# Patient Record
Sex: FEMALE | Race: WHITE | Employment: FULL TIME | URBAN - METROPOLITAN AREA
[De-identification: names, ages, dates, MRNs, and addresses within clinical notes are randomized per-mention and may not be internally consistent; named-entity substitution may affect disease eponyms.]

---

## 2018-07-03 ENCOUNTER — THERAPY VISIT (OUTPATIENT)
Dept: PHYSICAL THERAPY | Facility: CLINIC | Age: 48
End: 2018-07-03
Payer: COMMERCIAL

## 2018-07-03 DIAGNOSIS — M25.512 LEFT SHOULDER PAIN, UNSPECIFIED CHRONICITY: ICD-10-CM

## 2018-07-03 PROCEDURE — 97161 PT EVAL LOW COMPLEX 20 MIN: CPT | Mod: GP | Performed by: PHYSICAL THERAPIST

## 2018-07-03 PROCEDURE — 97110 THERAPEUTIC EXERCISES: CPT | Mod: GP | Performed by: PHYSICAL THERAPIST

## 2018-07-03 NOTE — MR AVS SNAPSHOT
After Visit Summary   7/3/2018    Tami Pedro    MRN: [id number]           Patient Information     Date Of Birth          1970        Visit Information        Provider Department      7/3/2018 2:10 PM Raven Florian PT [facility]        Today's Diagnoses     Left shoulder pain, unspecified chronicity           Follow-ups after your visit        Your next 10 appointments already scheduled     Jul 11, 2018  9:30 AM CDT   [facility] Extremity with Raven Florian PT   [facility] ([facility])    [address] [place] [id number] [phone]              Who to contact     If you have questions or need follow up information about today's clinic visit or your schedule please contact [facility] THERAP[facility] directly at [phone].  Normal or non-critical lab and imaging results will be communicated to you by MyChart, letter or phone within 4 business days after the clinic has received the results. If you do not hear from us within 7 days, please contact the clinic through [employer] or phone. If you have a critical or abnormal lab result, we will notify you by phone as soon as possible.  Submit refill requests through [employer] or call your pharmacy and they will forward the refill request to us. Please allow 3 business days for your refill to be completed.          Additional Information About Your Visit        MyChart Information     [employer] gives you secure access to your electronic health record. If you see a primary care provider, you can also send messages to your care team and make appointments. If you have questions, please call your primary care clinic.  If you do not have a primary care provider, please call [phone] and they will assist you.        Care EveryWhere ID     This is your Care EveryWhere ID. This could be used by other organizations to access your [place] medical records  [id number]          Blood Pressure from Last 3 Encounters:   No data found for BP    Weight from Last 3 Encounters:   No data found for Wt              We Performed the Following     HC PT EVAL, LOW COMPLEXITY     [facility] INITIAL EVAL REPORT     THERAPEUTIC EXERCISES        Primary Care Provider Office Phone # Fax #    Gladys Kraft [phone]       [address]   [facility] [place] [address]        Equal Access to Services     [facility]: Hadii aad ku hadasho Soomaali, waaxda luqadaha, qaybta kaalmada adeegyada, waxay idiin hayaan adeeg kharash la'aan . So [facility] [phone].    ATENCIÓN: Si habla español, tiene a goodwin disposición servicios gratuitos de asistencia lingüística. Llame al [phone].    We comply with applicable federal civil rights laws and [place] laws. We do not discriminate on the basis of race, color, national origin, age, disability, sex, sexual orientation, or gender identity.            Thank you!     Thank you for choosing [facility] PHYSICAL THERAPY [facility]  for your care. Our goal is always to provide you with excellent care. Hearing back from our patients is one way we can continue to improve our services. Please take a few minutes to complete the written survey that you may receive in the mail after your visit with us. Thank you!             Your Updated Medication List - Protect others around you: Learn how to safely use, store and throw away your medicines at www.disposemymeds.org.      Notice  As of 7/3/2018  2:47 PM    You have not been prescribed any medications.

## 2018-07-03 NOTE — PROGRESS NOTES
KEY PT FINDINGS:  1) Rotator cuff weakness, pain with resisted abduction only  2) Lacking internal rotation motion (functional IR up the back)  3) Scapular winging - assess scapular strength at the next visit    Physical Therapy Initial Evaluation: Subjective History     Injury/Condition Details:  Presenting Complaint Left Shoulder (Right hand dominant)   Onset Timing/Date Mid June 2018   Mechanism Woke up one day and had intermittent pain with certain motions. She thought she slept funny. She could feel things moving in her shoulder.   She notes that it feels better recently but it isn't gone.      Symptom Behavior Details    Primary Symptoms Sporadic symptoms; Activity/position dependent, pain (Location: Lateral arm occasional pain to the elbow but not often, Quality: Sharp and Aching/Throbbing), stiffness, weakness   Worst Pain 7/10 (with reaching behind her back )   Symptom Provocators Reaching up the back (hooking the bra), reaching out to the side, lifting heavier items.   Denies pain at night with sleeping. Can lay on her left shoulder.    Best Pain 0/10    Symptom Relievers Ice, ibuprofen   Time of day dependent? No   Recent symptom change? 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    Usual physical activities  (within past year) Garden (has not been able to do that for the past two weeks).    Orthopaedic history None   Notable medical history See [facility] Initial Evaluation  Subjective:  HPI                    Objective:  Standing Alignment:    Cervical/Thoracic:  Forward head  Shoulder/UE:  Rounded shoulders, scapular winging L and scapular winging R                  Flexibility/Screens:   Positive screens:  ShoulderNegative screens: Cervical    Upper Extremity:    Decreased left upper extremity flexibility at:  Pectoralis Major and Pectoralis Minor    Decreased right upper extremity flexibility present at:  Pectoralis Major and Pectoralis Minor                           Shoulder Evaluation:  ROM:  AROM:  normal          Internal Rotation:  Left:  To T12    Right:  To T9                    Pain: End range internal rotation and mild pain at end range ER    Strength:        Abduction:  Left:/5  Pain:+    Right: 4+/5     Pain:  Adduction:  Left: 4+/5    Pain:    Right: 4+/5     Pain:  Internal Rotation:  Left:4+/5     Pain:    Right: 4+/5     Pain:  External Rotation:   Left:4/5     Pain:   Right:4/5     Pain:            Stability Testing:  not assessed      Special Tests:    Left shoulder positive for the following special tests:  Impingement  Left shoulder negative for the following special tests:  Rotator cuff tear    Palpation:    Left shoulder tenderness present at:  Supraspinatus; Upper Trap and Bicipital Groove                                       General     ROS    Assessment/Plan:    Patient is a 48 year old female with left side shoulder complaints.    Patient has the following significant findings with corresponding treatment plan.                Diagnosis 1:  Rotator Cuff Syndrome  Pain -  hot/cold therapy, manual therapy, STS, splint/taping/bracing/orthotics, self management and education  Decreased ROM/flexibility - manual therapy, therapeutic exercise and home program  Decreased joint mobility - manual therapy, therapeutic exercise and home program  Decreased strength - therapeutic exercise, therapeutic activities and home program  Impaired muscle performance - neuro re-education and home program  Decreased function - therapeutic activities and home program    Therapy Evaluation Codes:   1) History comprised of:   Personal factors that impact the plan of care:      Time since onset of symptoms.    Comorbidity factors that impact the plan of  care are:      None.     Medications impacting care: None.  2) Examination of Body Systems comprised of:   Body structures and functions that impact the plan of care:      Shoulder.   Activity limitations that impact the plan of care are:      Dressing, Grasping, Lifting and Working.  3) Clinical presentation characteristics are:   Stable/Uncomplicated.  4) Decision-Making    Low complexity using standardized patient assessment instrument and/or measureable assessment of functional outcome.  Cumulative Therapy Evaluation is: Low complexity.    Previous and current functional limitations:  (See Goal Flow Sheet for this information)    Short term and Long term goals: (See Goal Flow Sheet for this information)     Communication ability:  Patient appears to be able to clearly communicate and understand verbal and written communication and follow directions correctly.  Treatment Explanation - The following has been discussed with the patient:   RX ordered/plan of care  Anticipated outcomes  Possible risks and side effects  This patient would benefit from PT intervention to resume normal activities.   Rehab potential is good.    Frequency:  1 X week, once daily  Duration:  for 4 weeks  Discharge Plan:  Achieve all LTG.  Independent in home treatment program.  Reach maximal therapeutic benefit.    Please refer to the daily flowsheet for treatment today, total treatment time and time spent performing 1:1 timed codes.

## 2018-07-04 ENCOUNTER — HEALTH MAINTENANCE LETTER (OUTPATIENT)
Age: 48
End: 2018-07-04

## 2018-08-02 ENCOUNTER — THERAPY VISIT (OUTPATIENT)
Dept: PHYSICAL THERAPY | Facility: CLINIC | Age: 48
End: 2018-08-02
Payer: COMMERCIAL

## 2018-08-02 DIAGNOSIS — M25.512 LEFT SHOULDER PAIN, UNSPECIFIED CHRONICITY: ICD-10-CM

## 2018-08-02 PROCEDURE — 97530 THERAPEUTIC ACTIVITIES: CPT | Mod: GP | Performed by: PHYSICAL THERAPIST

## 2018-08-02 NOTE — PROGRESS NOTES
Subjective:  HPI                    Objective:  System    Physical Exam    General     ROS    Assessment/Plan:    DISCHARGE REPORT    Progress reporting period is from 7/3 to 8/2.       SUBJECTIVE  Subjective changes noted by patient:  Reports that her shoulder is nearly 100%. She has been working diligently on her exercises. Has not yet added weights.     Current pain level is 0/10.     Previous pain level was  4/10.   Changes in function:  Yes (See Goal flowsheet attached for changes in current functional level)  Adverse reaction to treatment or activity: None    OBJECTIVE  Changes noted in objective findings:  The objective findings below are from DOS 8/2.  Objective: Full shoulder motion. No pain with resisted testing. Significantly improved SPADI.      ASSESSMENT/PLAN  Updated problem list and treatment plan: Diagnosis 1:  Rotator cuff dysfunction  None: is to continue to work on HEP  STG/LTGs have been met or progress has been made towards goals:  Yes (See Goal flow sheet completed today.)  Assessment of Progress: The patient has met all of their long term goals.  Self Management Plans:  Patient has been instructed in a home treatment program.  Patient  has been instructed in self management of symptoms.  I have re-evaluated this patient and find that the nature, scope, duration and intensity of the therapy is appropriate for the medical condition of the patient.  Tami continues to require the following intervention to meet STG and LTG's:  PT intervention is no longer required to meet STG/LTG.    Recommendations:  This patient is ready to be discharged from therapy and continue their home treatment program.    Please refer to the daily flowsheet for treatment today, total treatment time and time spent performing 1:1 timed codes.

## 2018-08-02 NOTE — MR AVS SNAPSHOT
After Visit Summary   8/2/2018    Tami Pedro    MRN: [id number]           Patient Information     Date Of Birth          1970        Visit Information        Provider Department      8/2/2018 10:20 AM Raven Florian, [facility]        Today's Diagnoses     Left shoulder pain, unspecified chronicity           Follow-ups after your visit        Who to contact     If you have questions or need follow up information about today's clinic visit or your schedule please contact [facility] PHYSICAL THERAPY [facility] directly at [phone].  Normal or non-critical lab and imaging results will be communicated to you by [employer], letter or phone within 4 business days after the clinic has received the results. If you do not hear from us within 7 days, please contact the clinic through [employer] or phone. If you have a critical or abnormal lab result, we will notify you by phone as soon as possible.  Submit refill requests through [employer] or call your pharmacy and they will forward the refill request to us. Please allow 3 business days for your refill to be completed.          Additional Information About Your Visit        MyChart Information     [employer] gives you secure access to your electronic health record. If you see a primary care provider, you can also send messages to your care team and make appointments. If you have questions, please call your primary care clinic.  If you do not have a primary care provider, please call [phone] and they will assist you.        Care EveryWhere ID     This is your Care EveryWhere ID. This could be used by other organizations to access your [place] medical records  [id number]         Blood Pressure from Last 3 Encounters:   No data found for BP    Weight from Last 3 Encounters:   No data found for Wt              We Performed the Following     THERAPEUTIC ACTIVITIES        Primary Care Provider Office Phone # Fax #    Gladys Kraft [phone]  [phone]       [address]   [place] [facility] [address]        Equal Access to Services     GABRIEL RIZZO : Hadii liz Black, agueda flores, bennygus connollyrileymaddy hensley, valeri louisemarinatonya wallace. So [facility] [phone].    ATENCIÓN: Si habla español, tiene a goodwin disposición servicios gratuitos de asistencia lingüística. Llame al [phone].    We comply with applicable federal civil rights laws and [place] laws. We do not discriminate on the basis of race, color, national origin, age, disability, sex, sexual orientation, or gender identity.            Thank you!     Thank you for choosing [facility] PHYSICAL THERAPY [facility]  for your care. Our goal is always to provide you with excellent care. Hearing back from our patients is one way we can continue to improve our services. Please take a few minutes to complete the written survey that you may receive in the mail after your visit with us. Thank you!             Your Updated Medication List - Protect others around you: Learn how to safely use, store and throw away your medicines at www.disposemymeds.org.      Notice  As of 8/2/2018 10:56 AM    You have not been prescribed any medications.

## 2019-12-13 ASSESSMENT — ANXIETY QUESTIONNAIRES
4. TROUBLE RELAXING: SEVERAL DAYS
3. WORRYING TOO MUCH ABOUT DIFFERENT THINGS: NOT AT ALL
6. BECOMING EASILY ANNOYED OR IRRITABLE: NOT AT ALL
2. NOT BEING ABLE TO STOP OR CONTROL WORRYING: NOT AT ALL
7. FEELING AFRAID AS IF SOMETHING AWFUL MIGHT HAPPEN: NOT AT ALL
1. FEELING NERVOUS, ANXIOUS, OR ON EDGE: SEVERAL DAYS
5. BEING SO RESTLESS THAT IT IS HARD TO SIT STILL: NOT AT ALL
GAD7 TOTAL SCORE: 2

## 2019-12-16 ASSESSMENT — ANXIETY QUESTIONNAIRES
GAD7 TOTAL SCORE: 2
7. FEELING AFRAID AS IF SOMETHING AWFUL MIGHT HAPPEN: NOT AT ALL
GAD7 TOTAL SCORE: 2

## 2019-12-16 ASSESSMENT — ENCOUNTER SYMPTOMS
MUSCLE WEAKNESS: 1
PANIC: 0
POLYPHAGIA: 0
JOINT SWELLING: 0
INSOMNIA: 1
MUSCLE CRAMPS: 0
CHILLS: 0
WEIGHT LOSS: 0
POLYDIPSIA: 0
HOT FLASHES: 1
ARTHRALGIAS: 0
HALLUCINATIONS: 0
DECREASED APPETITE: 0
DEPRESSION: 0
STIFFNESS: 0
BACK PAIN: 0
FEVER: 0
DECREASED CONCENTRATION: 0
NERVOUS/ANXIOUS: 1
ALTERED TEMPERATURE REGULATION: 0
INCREASED ENERGY: 1
MYALGIAS: 0
NIGHT SWEATS: 1
FATIGUE: 1
WEIGHT GAIN: 0
NECK PAIN: 0
DECREASED LIBIDO: 1

## 2019-12-17 ASSESSMENT — ANXIETY QUESTIONNAIRES: GAD7 TOTAL SCORE: 2

## 2020-01-13 ENCOUNTER — OFFICE VISIT (OUTPATIENT)
Dept: OBGYN | Facility: CLINIC | Age: 50
End: 2020-01-13
Attending: ADVANCED PRACTICE MIDWIFE
Payer: COMMERCIAL

## 2020-01-13 VITALS
WEIGHT: 108.4 LBS | HEIGHT: 59 IN | SYSTOLIC BLOOD PRESSURE: 120 MMHG | HEART RATE: 88 BPM | BODY MASS INDEX: 21.85 KG/M2 | DIASTOLIC BLOOD PRESSURE: 81 MMHG

## 2020-01-13 DIAGNOSIS — R68.82 LOW LIBIDO: ICD-10-CM

## 2020-01-13 DIAGNOSIS — R61 NIGHT SWEATS: ICD-10-CM

## 2020-01-13 DIAGNOSIS — N95.1 MENOPAUSAL SYNDROME (HOT FLASHES): ICD-10-CM

## 2020-01-13 DIAGNOSIS — N95.2 VAGINAL ATROPHY: ICD-10-CM

## 2020-01-13 DIAGNOSIS — Z78.0 MENOPAUSE: Primary | ICD-10-CM

## 2020-01-13 DIAGNOSIS — R53.83 FATIGUE, UNSPECIFIED TYPE: ICD-10-CM

## 2020-01-13 PROCEDURE — G0463 HOSPITAL OUTPT CLINIC VISIT: HCPCS | Mod: ZF

## 2020-01-13 RX ORDER — TRAZODONE HYDROCHLORIDE 50 MG/1
50 TABLET, FILM COATED ORAL
COMMUNITY

## 2020-01-13 SDOH — HEALTH STABILITY: MENTAL HEALTH: HOW OFTEN DO YOU HAVE A DRINK CONTAINING ALCOHOL?: 2-3 TIMES A WEEK

## 2020-01-13 ASSESSMENT — ANXIETY QUESTIONNAIRES
3. WORRYING TOO MUCH ABOUT DIFFERENT THINGS: NOT AT ALL
7. FEELING AFRAID AS IF SOMETHING AWFUL MIGHT HAPPEN: NOT AT ALL
1. FEELING NERVOUS, ANXIOUS, OR ON EDGE: SEVERAL DAYS
6. BECOMING EASILY ANNOYED OR IRRITABLE: SEVERAL DAYS
5. BEING SO RESTLESS THAT IT IS HARD TO SIT STILL: NOT AT ALL

## 2020-01-13 ASSESSMENT — PATIENT HEALTH QUESTIONNAIRE - PHQ9
5. POOR APPETITE OR OVEREATING: MORE THAN HALF THE DAYS
SUM OF ALL RESPONSES TO PHQ QUESTIONS 1-9: 6

## 2020-01-13 ASSESSMENT — MIFFLIN-ST. JEOR: SCORE: 1009.39

## 2020-01-13 NOTE — LETTER
2020       RE: Tami Pedro  [address] [facility] [address]     Dear Colleague,    Thank you for referring your patient, Tami Pedro, to the [facility] at [facility]. Please see a copy of my visit note below.    Multiple menopause s/s. Feeling very frustrated trying to find a provider who will give her options for treatment.   C/o of hot flashes multiple times a day  Night sweats almost nightly, soaks t-shirt or pjs.  Irritable  Low energy, fatigue  Decreased sex drive impacting her marriage  Vaginal dryness and pain with intercourse    Smokes 6-7 cigs/day, states last lipid panel normal, denies hx of migraine   No family or personal hx of DVT, CVD     SUBJECTIVE:  Tami Pedro is an 49 year old, ,  Menstrual History:  Menstrual History 2020   Menarche Age 12   Reviewed Today Yes           HISTORY:  traZODone (DESYREL) 50 MG tablet, Take 50 mg by mouth    No current facility-administered medications on file prior to visit.     Allergies   Allergen Reactions     Varenicline Rash       There is no immunization history on file for this patient.    OB History    Para Term  AB Living   1 1 0 1 0 0   SAB TAB Ectopic Multiple Live Births   0 0 0 0 0     History reviewed. No pertinent past medical history.  Past Surgical History:   Procedure Laterality Date     APPENDECTOMY  2002      SECTION       Family History   Problem Relation Age of Onset     Osteoarthritis Mother         in her spine     Hyperlipidemia Father      Cataracts Father      Osteoporosis Sister      Social History     Socioeconomic History     Marital status:      Spouse name: None     Number of children: None     Years of education: None     Highest education level: None   Occupational History     None   Social Needs     Financial resource strain: None     Food insecurity:     Worry: None     Inability: None     Transportation needs:  "    Medical: None     Non-medical: None   Tobacco Use     Smoking status: Current Every Day Smoker     Types: Cigarettes     Smokeless tobacco: Never Used   Substance and Sexual Activity     Alcohol use: Yes     Frequency: 2-3 times a week     Drug use: Never     Sexual activity: Yes     Partners: Male     Birth control/protection: Post-menopausal   Lifestyle     Physical activity:     Days per week: None     Minutes per session: None     Stress: None   Relationships     Social connections:     Talks on phone: None     Gets together: None     Attends Quaker service: None     Active member of club or organization: None     Attends meetings of clubs or organizations: None     Relationship status: None     Intimate partner violence:     Fear of current or ex partner: None     Emotionally abused: None     Physically abused: None     Forced sexual activity: None   Other Topics Concern     None   Social History Narrative     None    ROS: 10 point ROS neg other than the symptoms noted above in the HPI.      ROS    PHQ-9 SCORE 1/13/2020   PHQ-9 Total Score 6     MICHAEL-7 SCORE 12/13/2019 12/13/2019   Total Score 2 (minimal anxiety) 2 (minimal anxiety)   Total Score 2 2     ASCVD risk calculator: 1.6%, very low risk    EXAM:  Blood pressure 120/81, pulse 88, height 1.486 m (4' 10.5\"), weight 49.2 kg (108 lb 6.4 oz), not currently breastfeeding. Body mass index is 22.27 kg/m .  General - pleasant female in no acute distress.    Musculoskeletal - no gross deformities.  Neurological - normal strength, sensation, and mental status.        ASSESSMENT:  Encounter Diagnoses   Name Primary?     Menopause Yes     Vaginal atrophy      Menopausal syndrome (hot flashes)      Night sweats      Fatigue, unspecified type       PLAN:   Orders Placed This Encounter   Procedures     Lipid panel reflex to direct LDL Fasting     TSH with free T4 reflex     CBC with Platelets     Orders Placed This Encounter   Medications     traZODone (DESYREL) " 50 MG tablet     Sig: Take 50 mg by mouth     estradiol-levonorgestrel (CLIMARA PRO) 0.045-0.015 MG/DAY weekly patch     Sig: Place 1 patch onto the skin every 7 days     Dispense:  12 patch     Refill:  1     Additional teaching done at this visit regarding calcium (1200 mg per day), exercise, perimenopause, smoking cessation and mental health.  Pt not read to quit smoking  Discussed CVD, DVT risk with HRT, and reviewed risk calculator.  Suggest steady delivery of hormones with combo patch, pt agrees.  Will get fasting labs in the next 1-2 wks.  F/u in the next month or so, early as needed.  PACO Davidson CNM            Answers for HPI/ROS submitted by the patient on 12/16/2019   MICHAEL 7 TOTAL SCORE: 2  General Symptoms: Yes  Skin Symptoms: No  HENT Symptoms: No  EYE SYMPTOMS: No  HEART SYMPTOMS: No  LUNG SYMPTOMS: No  INTESTINAL SYMPTOMS: No  URINARY SYMPTOMS: No  GYNECOLOGIC SYMPTOMS: Yes  BREAST SYMPTOMS: No  SKELETAL SYMPTOMS: Yes  BLOOD SYMPTOMS: No  NERVOUS SYSTEM SYMPTOMS: No  MENTAL HEALTH SYMPTOMS: Yes  Fever: No  Loss of appetite: No  Weight loss: No  Weight gain: No  Fatigue: Yes  Night sweats: Yes  Chills: No  Increased stress: Yes  Excessive hunger: No  Excessive thirst: No  Feeling hot or cold when others believe the temperature is normal: No  Loss of height: No  Post-operative complications: No  Surgical site pain: No  Hallucinations: No  Change in or Loss of Energy: Yes  Hyperactivity: No  Confusion: No  Back pain: No  Muscle aches: No  Neck pain: No  Swollen joints: No  Joint pain: No  Bone pain: No  Muscle cramps: No  Muscle weakness: Yes  Joint stiffness: No  Bone fracture: No  Bleeding or spotting between periods: No  Heavy or painful periods: No  Irregular periods: No  Vaginal discharge: No  Hot flashes: Yes  Vaginal dryness: Yes  Genital ulcers: No  Reduced libido: Yes  Painful intercourse: Yes  Difficulty with sexual arousal: Yes  Post-menopausal bleeding: No  Nervous or Anxious:  Yes  Depression: No  Trouble sleeping: Yes  Trouble thinking or concentrating: No  Mood changes: Yes  Panic attacks: No

## 2020-01-13 NOTE — NURSING NOTE
Chief Complaint   Patient presents with     Consult     Consult for management of menopause symptoms.        See OLEG Gu 1/13/2020

## 2020-01-14 PROBLEM — E78.00 HIGH CHOLESTEROL: Status: ACTIVE | Noted: 2020-01-14

## 2020-01-14 NOTE — PROGRESS NOTES
Multiple menopause s/s. Feeling very frustrated trying to find a provider who will give her options for treatment.   C/o of hot flashes multiple times a day  Night sweats almost nightly, soaks t-shirt or pjs.  Irritable  Low energy, fatigue  Decreased sex drive impacting her marriage  Vaginal dryness and pain with intercourse    Smokes 6-7 cigs/day, states last lipid panel normal, denies hx of migraine   No family or personal hx of DVT, CVD     SUBJECTIVE:  Tami Pedro is an 49 year old, ,  Menstrual History:  Menstrual History 2020   Menarche Age 12   Reviewed Today Yes           HISTORY:  traZODone (DESYREL) 50 MG tablet, Take 50 mg by mouth    No current facility-administered medications on file prior to visit.     Allergies   Allergen Reactions     Varenicline Rash       There is no immunization history on file for this patient.    OB History    Para Term  AB Living   1 1 0 1 0 0   SAB TAB Ectopic Multiple Live Births   0 0 0 0 0     History reviewed. No pertinent past medical history.  Past Surgical History:   Procedure Laterality Date     APPENDECTOMY  2002      SECTION       Family History   Problem Relation Age of Onset     Osteoarthritis Mother         in her spine     Hyperlipidemia Father      Cataracts Father      Osteoporosis Sister      Social History     Socioeconomic History     Marital status:      Spouse name: None     Number of children: None     Years of education: None     Highest education level: None   Occupational History     None   Social Needs     Financial resource strain: None     Food insecurity:     Worry: None     Inability: None     Transportation needs:     Medical: None     Non-medical: None   Tobacco Use     Smoking status: Current Every Day Smoker     Types: Cigarettes     Smokeless tobacco: Never Used   Substance and Sexual Activity     Alcohol use: Yes     Frequency: 2-3 times a week     Drug use: Never     Sexual activity: Yes      "Partners: Male     Birth control/protection: Post-menopausal   Lifestyle     Physical activity:     Days per week: None     Minutes per session: None     Stress: None   Relationships     Social connections:     Talks on phone: None     Gets together: None     Attends Zoroastrian service: None     Active member of club or organization: None     Attends meetings of clubs or organizations: None     Relationship status: None     Intimate partner violence:     Fear of current or ex partner: None     Emotionally abused: None     Physically abused: None     Forced sexual activity: None   Other Topics Concern     None   Social History Narrative     None    ROS: 10 point ROS neg other than the symptoms noted above in the HPI.      ROS    PHQ-9 SCORE 1/13/2020   PHQ-9 Total Score 6     MICHAEL-7 SCORE 12/13/2019 12/13/2019   Total Score 2 (minimal anxiety) 2 (minimal anxiety)   Total Score 2 2     ASCVD risk calculator: 1.6%, very low risk    EXAM:  Blood pressure 120/81, pulse 88, height 1.486 m (4' 10.5\"), weight 49.2 kg (108 lb 6.4 oz), not currently breastfeeding. Body mass index is 22.27 kg/m .  General - pleasant female in no acute distress.    Musculoskeletal - no gross deformities.  Neurological - normal strength, sensation, and mental status.        ASSESSMENT:  Encounter Diagnoses   Name Primary?     Menopause Yes     Vaginal atrophy      Menopausal syndrome (hot flashes)      Night sweats      Fatigue, unspecified type         PLAN:   Orders Placed This Encounter   Procedures     Lipid panel reflex to direct LDL Fasting     TSH with free T4 reflex     CBC with Platelets     Orders Placed This Encounter   Medications     traZODone (DESYREL) 50 MG tablet     Sig: Take 50 mg by mouth     estradiol-levonorgestrel (CLIMARA PRO) 0.045-0.015 MG/DAY weekly patch     Sig: Place 1 patch onto the skin every 7 days     Dispense:  12 patch     Refill:  1       Additional teaching done at this visit regarding calcium (1200 mg per " day), exercise, perimenopause, smoking cessation and mental health.  Pt not read to quit smoking  Discussed CVD, DVT risk with HRT, and reviewed risk calculator.  Suggest steady delivery of hormones with combo patch, pt agrees.  Will get fasting labs in the next 1-2 wks.  F/u in the next month or so, early as needed.  PACO Davidson CNNASRA            Answers for HPI/ROS submitted by the patient on 12/16/2019   MICHAEL 7 TOTAL SCORE: 2  General Symptoms: Yes  Skin Symptoms: No  HENT Symptoms: No  EYE SYMPTOMS: No  HEART SYMPTOMS: No  LUNG SYMPTOMS: No  INTESTINAL SYMPTOMS: No  URINARY SYMPTOMS: No  GYNECOLOGIC SYMPTOMS: Yes  BREAST SYMPTOMS: No  SKELETAL SYMPTOMS: Yes  BLOOD SYMPTOMS: No  NERVOUS SYSTEM SYMPTOMS: No  MENTAL HEALTH SYMPTOMS: Yes  Fever: No  Loss of appetite: No  Weight loss: No  Weight gain: No  Fatigue: Yes  Night sweats: Yes  Chills: No  Increased stress: Yes  Excessive hunger: No  Excessive thirst: No  Feeling hot or cold when others believe the temperature is normal: No  Loss of height: No  Post-operative complications: No  Surgical site pain: No  Hallucinations: No  Change in or Loss of Energy: Yes  Hyperactivity: No  Confusion: No  Back pain: No  Muscle aches: No  Neck pain: No  Swollen joints: No  Joint pain: No  Bone pain: No  Muscle cramps: No  Muscle weakness: Yes  Joint stiffness: No  Bone fracture: No  Bleeding or spotting between periods: No  Heavy or painful periods: No  Irregular periods: No  Vaginal discharge: No  Hot flashes: Yes  Vaginal dryness: Yes  Genital ulcers: No  Reduced libido: Yes  Painful intercourse: Yes  Difficulty with sexual arousal: Yes  Post-menopausal bleeding: No  Nervous or Anxious: Yes  Depression: No  Trouble sleeping: Yes  Trouble thinking or concentrating: No  Mood changes: Yes  Panic attacks: No

## 2020-03-11 ENCOUNTER — HEALTH MAINTENANCE LETTER (OUTPATIENT)
Age: 50
End: 2020-03-11

## 2020-07-15 DIAGNOSIS — Z78.0 MENOPAUSE: ICD-10-CM

## 2020-07-15 DIAGNOSIS — N95.2 VAGINAL ATROPHY: ICD-10-CM

## 2020-07-15 DIAGNOSIS — R61 NIGHT SWEATS: ICD-10-CM

## 2020-07-15 DIAGNOSIS — N95.1 MENOPAUSAL SYNDROME (HOT FLASHES): ICD-10-CM

## 2020-09-16 DIAGNOSIS — N95.2 VAGINAL ATROPHY: ICD-10-CM

## 2020-09-16 DIAGNOSIS — Z78.0 MENOPAUSE: ICD-10-CM

## 2020-09-16 DIAGNOSIS — R61 NIGHT SWEATS: ICD-10-CM

## 2020-09-16 DIAGNOSIS — N95.1 MENOPAUSAL SYNDROME (HOT FLASHES): ICD-10-CM

## 2020-09-16 NOTE — TELEPHONE ENCOUNTER
Received refill request for climara.  Last in clinic 1/2020 where the following plan was made (see plan copied below).  A short-term refill was given in July with request to schedule follow-up clinic visit but this has not been done.  Forwarding to Symone Golden for review.    'Additional teaching done at this visit regarding calcium (1200 mg per day), exercise, perimenopause, smoking cessation and mental health.  Pt not read to quit smoking  Discussed CVD, DVT risk with HRT, and reviewed risk calculator.  Suggest steady delivery of hormones with combo patch, pt agrees.  Will get fasting labs in the next 1-2 wks.  F/u in the next month or so, early as needed.  Symone Golden, APRN CNM'

## 2020-09-23 NOTE — TELEPHONE ENCOUNTER
Received note back from midwife Rose Landa (see note copied below) to call patient and have her reschedule.   She also sent refill of medication.    Tried to reach Tami but received voicemail.  Left message that refill request was received and a supply was sent to pharmacy but Symone Golden ordered labs for you back in January and would like you to complete them and then have a follow-up visit with her. The labs are fasting, so nothing to eat or drink for 10-12 hours except small sips of water.  The number to schedule with Symone Golden is [phone].    Maci Landa APRN CNM sent to Georgie Conteh RN               Pt should also schedule a recheck appt per [facility] note

## 2021-01-03 ENCOUNTER — HEALTH MAINTENANCE LETTER (OUTPATIENT)
Age: 51
End: 2021-01-03

## 2021-03-30 DIAGNOSIS — R61 NIGHT SWEATS: ICD-10-CM

## 2021-03-30 DIAGNOSIS — N95.1 MENOPAUSAL SYNDROME (HOT FLASHES): ICD-10-CM

## 2021-03-30 DIAGNOSIS — Z78.0 MENOPAUSE: ICD-10-CM

## 2021-03-30 DIAGNOSIS — N95.2 VAGINAL ATROPHY: ICD-10-CM

## 2021-04-12 NOTE — TELEPHONE ENCOUNTER
Received refill request for Zully.  Last in clinic January 2020. Patient is due for visit. Short-term supply sent and [employer] message reminder for appointment.

## 2021-04-25 ENCOUNTER — HEALTH MAINTENANCE LETTER (OUTPATIENT)
Age: 51
End: 2021-04-25

## 2021-05-28 ENCOUNTER — RECORDS - HEALTHEAST (OUTPATIENT)
Dept: ADMINISTRATIVE | Facility: CLINIC | Age: 51
End: 2021-05-28

## 2021-10-10 ENCOUNTER — HEALTH MAINTENANCE LETTER (OUTPATIENT)
Age: 51
End: 2021-10-10

## 2022-05-21 ENCOUNTER — HEALTH MAINTENANCE LETTER (OUTPATIENT)
Age: 52
End: 2022-05-21

## 2022-09-18 ENCOUNTER — HEALTH MAINTENANCE LETTER (OUTPATIENT)
Age: 52
End: 2022-09-18

## 2023-06-04 ENCOUNTER — HEALTH MAINTENANCE LETTER (OUTPATIENT)
Age: 53
End: 2023-06-04